# Patient Record
Sex: FEMALE | Race: WHITE
[De-identification: names, ages, dates, MRNs, and addresses within clinical notes are randomized per-mention and may not be internally consistent; named-entity substitution may affect disease eponyms.]

---

## 2019-06-15 ENCOUNTER — HOSPITAL ENCOUNTER (EMERGENCY)
Dept: HOSPITAL 41 - JD.ED | Age: 39
LOS: 1 days | Discharge: HOME | End: 2019-06-16
Payer: MEDICAID
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
## 2019-06-15 DIAGNOSIS — Z90.49: ICD-10-CM

## 2019-06-15 DIAGNOSIS — F17.210: ICD-10-CM

## 2019-06-15 DIAGNOSIS — M79.10: Primary | ICD-10-CM

## 2019-06-16 NOTE — EDM.PDOC
ED HPI GENERAL MEDICAL PROBLEM





- General


Chief Complaint: Lower Extremity Injury/Pain


Stated Complaint: FELL OFF A LADDER


Time Seen by Provider: 06/15/19 23:35


Source of Information: Reports: Patient, RN Notes Reviewed


History Limitations: Reports: No Limitations





- History of Present Illness


INITIAL COMMENTS - FREE TEXT/NARRATIVE: 





The patient states that she was about 3 feet up on a ladder in her garage, when 

her dog struck the ladder, causing the latter to follow over, and her to fall 

onto the concrete floor, around 14:00. She states that she landed on her 

buttocks. She states that she is suffering from buttock and sacral pain. She 

states that she has taken both Tylenol and ibuprofen, but that they are 

inadequate. She states that she is otherwise uninjured. No prior sacral or 

buttock injury.





The patient drove herself to the ED.





The patient does not have a PCP.








Treatments PTA: Reports: Acetaminophen, Other (see below)


Other Treatments PTA: aleve


  ** Buttock


Pain Score (Numeric/FACES): 8





- Related Data


 Allergies











Allergy/AdvReac Type Severity Reaction Status Date / Time


 


No Known Allergies Allergy   Verified 06/15/19 22:51











Home Meds: 


 Home Meds





. [No Known Home Meds]  06/15/19 [History]











Past Medical History





- Past Surgical History


GI Surgical History: Reports: Cholecystectomy (2014)





Social & Family History





- Tobacco Use


Smoking Status *Q: Light Tobacco Smoker


Years of Tobacco use: 10


Packs/Tins Daily: 0.2





- Caffeine Use


Caffeine Use: Reports: Coffee





- Alcohol Use


Alcohol Use History: No





- Recreational Drug Use


Recreational Drug Use: No





- Living Situation & Occupation


Living situation: Reports: Single, with Family


Occupation: Unemployed





ED ROS GENERAL





- Review of Systems


Review Of Systems: ROS reveals no pertinent complaints other than HPI.





ED EXAM, GENERAL





- Physical Exam


Exam: See Below


Exam Limited By: No Limitations


General Appearance: Alert, WD/WN, No Apparent Distress


Back Exam: Normal Inspection, Full Range of Motion, Other (No visible 

abnormality to the lower back or buttocks, such as swelling, erythema, 

ecchymosis, or abrasion. The patient reports tenderness primarily over the 

coccygeal and bilateral buttocks, with no sacral tenderness.)





Course





- Vital Signs


Last Recorded V/S: 


 Last Vital Signs











Temp      


 


Pulse  71   06/15/19 22:48


 


Resp  18   06/15/19 22:48


 


BP  150/101 H  06/15/19 22:48


 


Pulse Ox  100   06/15/19 22:48














- Orders/Labs/Meds


Orders: 


 Active Orders 24 hr











 Category Date Time Status


 


 Sacrum Coccyx Min 2V [CR] Stat Exams  06/15/19 23:37 Taken














- Re-Assessments/Exams


Free Text/Narrative Re-Assessment/Exam: 





06/16/19 00:12


3-view radiographs of the sacrum and coccyx appear to be grossly normal. The 

tip of the coccyx is very grainy, and I cannot say with certainty that there is 

no fracture, but from what I am able to see, no fracture is seen. Formal read 

per the Radiologist pending.








06/16/19 00:27


If the patient in fact fell 3 feet onto a concrete floor directly onto her 

buttocks, I would expect that she would have significant pain, however, I would 

also expect there to be at least a subtle bruise, erythema, or swelling, and in 

this patient's case, I see none of that. The patient is pressing me for a 

stronger medication, telling me the Tylenol and ibuprofen have not been helpful

, and when I explained that with negative x-rays, I am not in a position to 

prescribe opioids, the patient appeared to get angry. She may be drug seeking. 

I will refer her to the clinic for follow-up.





Departure





- Departure


Time of Disposition: 00:28


Disposition: Home, Self-Care 01


Condition: Good


Clinical Impression: 


 Traumatic buttock pain








- Discharge Information


*PRESCRIPTION DRUG MONITORING PROGRAM REVIEWED*: No


*COPY OF PRESCRIPTION DRUG MONITORING REPORT IN PATIENT [facility]LYN: No


Referrals: 


Alex Troncoso PA [Physician Assistant] - 


Forms:  ED Department Discharge


Additional Instructions: 


You were seen in the emergency room for sacral and buttock pain after falling 

off a ladder.





Workup in the ER included x-rays of your pelvis, sacrum, and coccyx, which 

returned normal. No broken bones or dislocations were found.





On examination, no abrasions, swelling, or bruising was found.





We recommend that you take over-the-counter ibuprofen, 3 tablets (600 mg) every 

8 hours, with food, as needed for discomfort.





Consider also applying an ice pack to the painful areas.





Follow-up with ANDREINA Carrillo, or one of the other providers in the clinic, 

as needed.





If any other problems, please do not hesitate to return to the ER.





- My Orders


Last 24 Hours: 


My Active Orders





06/15/19 23:37


Sacrum Coccyx Min 2V [CR] Stat 














- Assessment/Plan


Last 24 Hours: 


My Active Orders





06/15/19 23:37


Sacrum Coccyx Min 2V [CR] Stat

## 2019-06-17 NOTE — CR
Sacrum and coccyx:  Three views of the sacrum and coccyx were 

obtained.

 

Sacroiliac joints are normal.  No discrete fracture or other 

abnormality is seen.

 

Impression:

1.  No abnormality is appreciated on sacrum and coccyx study.

 

Diagnostic code #1